# Patient Record
Sex: FEMALE | Race: WHITE
[De-identification: names, ages, dates, MRNs, and addresses within clinical notes are randomized per-mention and may not be internally consistent; named-entity substitution may affect disease eponyms.]

---

## 2018-04-21 ENCOUNTER — HOSPITAL ENCOUNTER (EMERGENCY)
Dept: HOSPITAL 50 - VM.ED | Age: 48
Discharge: HOME | End: 2018-04-21
Payer: COMMERCIAL

## 2018-04-21 DIAGNOSIS — H60.92: Primary | ICD-10-CM

## 2018-04-21 NOTE — EDM.PDOC
ED HPI GENERAL MEDICAL PROBLEM





- General


Chief Complaint: ENT Problem


Stated Complaint: SWOLLEN EAR


Time Seen by Provider: 04/21/18 12:03


Source of Information: Reports: Patient


History Limitations: Reports: No Limitations





- History of Present Illness


INITIAL COMMENTS - FREE TEXT/NARRATIVE: 


Patient complains of swollen, painful left ear that has been intermittently 

like this over the last few days.  She does live in [facility] and here for a 

[employer] conference.  She has taken ibuprofen which helped slightly.  Was 

encouraged to come in by someone she knows.  She has no other complaints today.

  Denies headache, SOB, abdominal pain, nausea/vomiting, blood in urine or stool

, denies urinary symptoms.


Onset: Gradual


Duration: Getting Worse


Quality: Reports: Sharp


Severity: Moderate


Improves with: Reports: Medication





ED ROS ENT





- Review of Systems


Review Of Systems: See Below


Constitutional: Reports: No Symptoms


HEENT: Reports: Ear Pain


Respiratory: Reports: No Symptoms


Cardiovascular: Reports: No Symptoms


Endocrine: Reports: No Symptoms


GI/Abdominal: Reports: No Symptoms


: Reports: No Symptoms


Musculoskeletal: Reports: No Symptoms


Skin: Reports: No Symptoms


Neurological: Reports: No Symptoms


Psychiatric: Reports: No Symptoms


Hematologic/Lymphatic: Reports: No Symptoms


Immunologic: Reports: No Symptoms





ED EXAM, ENT





- Physical Exam


Exam: See Below


Exam Limited By: No Limitations


General Appearance: Alert, WD/WN, No Apparent Distress


Ears: Normal TMs, Canal Swelling (canal tenderness)


Mouth/Throat: Normal Inspection, Normal Gums, Normal Lips, Normal Oropharynx, 

Normal Teeth


Head: Atraumatic, Normocephalic


Neck: Normal Inspection, Supple, Non-Tender, Full Range of Motion


Respiratory/Chest: No Respiratory Distress, Lungs Clear, Normal Breath Sounds, 

No Accessory Muscle Use, Chest Non-Tender


Cardiovascular: Normal Peripheral Pulses, Regular Rate, Rhythm, No Edema, No 

Gallop, No JVD, No Murmur, No Rub


GI/Abdominal: Normal Bowel Sounds, Soft, Non-Tender, No Organomegaly, No 

Distention, No Abnormal Bruit, No Mass


 (Female) Exam: Normal External Exam, Normal Speculum Exam, Normal Bimanual 

Exam


Back: Normal Inspection, Full Range of Motion


Extremities: Normal Inspection, Normal Range of Motion, Non-Tender, No Pedal 

Edema, Normal Capillary Refill


Neurological: Alert, Oriented, CN II-XII Intact, Normal Cognition, Normal Gait, 

Normal Reflexes, No Motor/Sensory Deficits


Psychiatric: Normal Affect, Normal Mood


Skin: Warm, Dry, Intact, Normal Color, No Rash


Lymphatic: No Adenopathy





Departure





- Departure


Time of Disposition: 12:08


Disposition: Home, Self-Care 01


Condition: Good


Clinical Impression: 


 Otitis externa








- Discharge Information


Instructions:  Otitis Externa, Easy-to-Read


Additional Instructions: 


You have a prescription for ciprodex drops.  Place 3-4 drops in the left ear 4 

times a day for 5 days.





Continue with the tylenol and ibuprofen for pain.





Follow up with your primary doctor as needed for symptom management.





Please call us with any questions or concerns.





- Problem List & Annotations


(1) Otitis externa


SNOMED Code(s): 5127393


   Code(s): H60.90 - UNSPECIFIED OTITIS EXTERNA, UNSPECIFIED EAR   Status: 

Acute   Priority: Low   


Qualifiers: 


   Otitis externa type: diffuse   Chronicity: acute   Laterality: left   

Qualified Code(s): H60.312 - Diffuse otitis externa, left ear   





- Problem List Review


Problem List Initiated/Reviewed/Updated: Yes





- Assessment/Plan


Assessment:: 





otitis externa, left


Plan: 





You have a prescription for ciprodex drops.  Place 3-4 drops in the left ear 4 

times a day for 5 days.





Continue with the tylenol and ibuprofen for pain.





Follow up with your primary doctor as needed for symptom management.





Please call us with any questions or concerns.